# Patient Record
Sex: MALE | Race: WHITE | Employment: FULL TIME | URBAN - METROPOLITAN AREA
[De-identification: names, ages, dates, MRNs, and addresses within clinical notes are randomized per-mention and may not be internally consistent; named-entity substitution may affect disease eponyms.]

---

## 2015-02-07 LAB
CHOLESTEROL, TOTAL: 227 MG/DL
CHOLESTEROL/HDL RATIO: 6
HDLC SERPL-MCNC: 38 MG/DL (ref 35–70)
LDL CHOLESTEROL CALCULATED: 166 MG/DL (ref 0–160)
TRIGL SERPL-MCNC: 114 MG/DL
VLDLC SERPL CALC-MCNC: 23 MG/DL

## 2017-04-29 DIAGNOSIS — E78.5 HYPERLIPIDEMIA: ICD-10-CM

## 2017-06-29 ENCOUNTER — HOSPITAL ENCOUNTER (EMERGENCY)
Age: 47
Discharge: HOME | End: 2017-06-29
Attending: SPECIALIST
Payer: COMMERCIAL

## 2017-06-29 ENCOUNTER — APPOINTMENT (OUTPATIENT)
Dept: GENERAL RADIOLOGY | Age: 47
End: 2017-06-29
Payer: COMMERCIAL

## 2017-06-29 VITALS
TEMPERATURE: 98.4 F | WEIGHT: 210 LBS | OXYGEN SATURATION: 96 % | HEART RATE: 76 BPM | SYSTOLIC BLOOD PRESSURE: 136 MMHG | BODY MASS INDEX: 26.95 KG/M2 | DIASTOLIC BLOOD PRESSURE: 82 MMHG | RESPIRATION RATE: 18 BRPM | HEIGHT: 74 IN

## 2017-06-29 DIAGNOSIS — R07.89 CHEST TIGHTNESS: ICD-10-CM

## 2017-06-29 DIAGNOSIS — R42 DIZZINESS: Primary | ICD-10-CM

## 2017-06-29 LAB
ABSOLUTE EOS #: 0 K/UL (ref 0–0.4)
ABSOLUTE LYMPH #: 1.1 K/UL (ref 1–4.8)
ABSOLUTE MONO #: 0.4 K/UL (ref 0.1–1.2)
ANION GAP SERPL CALCULATED.3IONS-SCNC: 15 MMOL/L (ref 9–17)
BASOPHILS # BLD: 1 %
BASOPHILS ABSOLUTE: 0 K/UL (ref 0–0.2)
BUN BLDV-MCNC: 10 MG/DL (ref 6–20)
BUN/CREAT BLD: ABNORMAL (ref 9–20)
CALCIUM SERPL-MCNC: 9.1 MG/DL (ref 8.6–10.4)
CHLORIDE BLD-SCNC: 103 MMOL/L (ref 98–107)
CO2: 24 MMOL/L (ref 20–31)
CREAT SERPL-MCNC: 1.05 MG/DL (ref 0.7–1.2)
DIFFERENTIAL TYPE: NORMAL
EKG ATRIAL RATE: 60 BPM
EKG ATRIAL RATE: 74 BPM
EKG P AXIS: 35 DEGREES
EKG P AXIS: 42 DEGREES
EKG P-R INTERVAL: 130 MS
EKG P-R INTERVAL: 134 MS
EKG Q-T INTERVAL: 392 MS
EKG Q-T INTERVAL: 416 MS
EKG QRS DURATION: 102 MS
EKG QRS DURATION: 102 MS
EKG QTC CALCULATION (BAZETT): 416 MS
EKG QTC CALCULATION (BAZETT): 435 MS
EKG R AXIS: -12 DEGREES
EKG R AXIS: -15 DEGREES
EKG T AXIS: 18 DEGREES
EKG T AXIS: 22 DEGREES
EKG VENTRICULAR RATE: 60 BPM
EKG VENTRICULAR RATE: 74 BPM
EOSINOPHILS RELATIVE PERCENT: 1 %
GFR AFRICAN AMERICAN: >60 ML/MIN
GFR NON-AFRICAN AMERICAN: >60 ML/MIN
GFR SERPL CREATININE-BSD FRML MDRD: ABNORMAL ML/MIN/{1.73_M2}
GFR SERPL CREATININE-BSD FRML MDRD: ABNORMAL ML/MIN/{1.73_M2}
GLUCOSE BLD-MCNC: 120 MG/DL (ref 70–99)
HCT VFR BLD CALC: 50.9 % (ref 41–53)
HEMOGLOBIN: 16.9 G/DL (ref 13.5–17.5)
LYMPHOCYTES # BLD: 15 %
MAGNESIUM: 2.2 MG/DL (ref 1.6–2.6)
MCH RBC QN AUTO: 28.9 PG (ref 26–34)
MCHC RBC AUTO-ENTMCNC: 33.3 G/DL (ref 31–37)
MCV RBC AUTO: 86.9 FL (ref 80–100)
MONOCYTES # BLD: 5 %
MYOGLOBIN: 32 NG/ML (ref 28–72)
MYOGLOBIN: 35 NG/ML (ref 28–72)
PDW BLD-RTO: 13.1 % (ref 12.5–15.4)
PLATELET # BLD: 240 K/UL (ref 140–450)
PLATELET ESTIMATE: NORMAL
PMV BLD AUTO: 8.4 FL (ref 6–12)
POTASSIUM SERPL-SCNC: 3.7 MMOL/L (ref 3.7–5.3)
RBC # BLD: 5.86 M/UL (ref 4.5–5.9)
RBC # BLD: NORMAL 10*6/UL
SEG NEUTROPHILS: 78 %
SEGMENTED NEUTROPHILS ABSOLUTE COUNT: 5.8 K/UL (ref 1.8–7.7)
SODIUM BLD-SCNC: 142 MMOL/L (ref 135–144)
TROPONIN INTERP: NORMAL
TROPONIN INTERP: NORMAL
TROPONIN T: <0.03 NG/ML
TROPONIN T: <0.03 NG/ML
WBC # BLD: 7.4 K/UL (ref 3.5–11)
WBC # BLD: NORMAL 10*3/UL

## 2017-06-29 PROCEDURE — 84484 ASSAY OF TROPONIN QUANT: CPT

## 2017-06-29 PROCEDURE — 85025 COMPLETE CBC W/AUTO DIFF WBC: CPT

## 2017-06-29 PROCEDURE — 36415 COLL VENOUS BLD VENIPUNCTURE: CPT

## 2017-06-29 PROCEDURE — 99285 EMERGENCY DEPT VISIT HI MDM: CPT

## 2017-06-29 PROCEDURE — 71020 XR CHEST STANDARD TWO VW: CPT

## 2017-06-29 PROCEDURE — 96360 HYDRATION IV INFUSION INIT: CPT

## 2017-06-29 PROCEDURE — 6370000000 HC RX 637 (ALT 250 FOR IP): Performed by: SPECIALIST

## 2017-06-29 PROCEDURE — 80048 BASIC METABOLIC PNL TOTAL CA: CPT

## 2017-06-29 PROCEDURE — 93005 ELECTROCARDIOGRAM TRACING: CPT

## 2017-06-29 PROCEDURE — 83874 ASSAY OF MYOGLOBIN: CPT

## 2017-06-29 PROCEDURE — 83735 ASSAY OF MAGNESIUM: CPT

## 2017-06-29 PROCEDURE — 96361 HYDRATE IV INFUSION ADD-ON: CPT

## 2017-06-29 PROCEDURE — 2580000003 HC RX 258: Performed by: SPECIALIST

## 2017-06-29 RX ORDER — ASPIRIN 81 MG/1
324 TABLET, CHEWABLE ORAL ONCE
Status: COMPLETED | OUTPATIENT
Start: 2017-06-29 | End: 2017-06-29

## 2017-06-29 RX ORDER — DIAZEPAM 5 MG/1
5 TABLET ORAL EVERY 8 HOURS PRN
Qty: 12 TABLET | Refills: 0 | Status: SHIPPED | OUTPATIENT
Start: 2017-06-29 | End: 2017-07-31 | Stop reason: SDUPTHER

## 2017-06-29 RX ORDER — SODIUM CHLORIDE 9 MG/ML
INJECTION, SOLUTION INTRAVENOUS CONTINUOUS
Status: DISCONTINUED | OUTPATIENT
Start: 2017-06-29 | End: 2017-06-29 | Stop reason: HOSPADM

## 2017-06-29 RX ORDER — 0.9 % SODIUM CHLORIDE 0.9 %
500 INTRAVENOUS SOLUTION INTRAVENOUS ONCE
Status: COMPLETED | OUTPATIENT
Start: 2017-06-29 | End: 2017-06-29

## 2017-06-29 RX ORDER — DIAZEPAM 5 MG/1
5 TABLET ORAL ONCE
Status: COMPLETED | OUTPATIENT
Start: 2017-06-29 | End: 2017-06-29

## 2017-06-29 RX ADMIN — SODIUM CHLORIDE 500 ML: 9 INJECTION, SOLUTION INTRAVENOUS at 09:43

## 2017-06-29 RX ADMIN — DIAZEPAM 5 MG: 5 TABLET ORAL at 09:43

## 2017-06-29 RX ADMIN — ASPIRIN 81 MG 324 MG: 81 TABLET ORAL at 09:42

## 2017-06-29 ASSESSMENT — ENCOUNTER SYMPTOMS
NAUSEA: 1
VOMITING: 0
ABDOMINAL PAIN: 0
WHEEZING: 0
CHEST TIGHTNESS: 1
SHORTNESS OF BREATH: 0
DIARRHEA: 0

## 2017-07-20 PROBLEM — G47.9 SLEEP DISTURBANCE: Status: ACTIVE | Noted: 2017-07-20

## 2017-07-20 PROBLEM — F41.8 ANXIETY ABOUT HEALTH: Status: ACTIVE | Noted: 2017-07-20

## 2017-08-14 ENCOUNTER — PATIENT MESSAGE (OUTPATIENT)
Dept: FAMILY MEDICINE CLINIC | Age: 47
End: 2017-08-14

## 2017-08-31 PROBLEM — R10.13 DYSPEPSIA: Status: ACTIVE | Noted: 2017-08-31

## 2017-09-08 PROBLEM — G47.00 INSOMNIA: Status: ACTIVE | Noted: 2017-09-08

## 2017-10-02 PROBLEM — F41.1 GENERALIZED ANXIETY DISORDER: Status: ACTIVE | Noted: 2017-10-02

## 2018-02-14 ENCOUNTER — PATIENT MESSAGE (OUTPATIENT)
Dept: FAMILY MEDICINE CLINIC | Age: 48
End: 2018-02-14

## 2020-03-23 ENCOUNTER — PATIENT MESSAGE (OUTPATIENT)
Dept: PRIMARY CARE CLINIC | Age: 50
End: 2020-03-23

## 2020-03-24 RX ORDER — ZOLPIDEM TARTRATE 12.5 MG/1
12.5 TABLET, FILM COATED, EXTENDED RELEASE ORAL NIGHTLY PRN
Qty: 30 TABLET | Refills: 1 | Status: SHIPPED | OUTPATIENT
Start: 2020-03-24 | End: 2020-04-23

## 2020-06-01 ENCOUNTER — PATIENT MESSAGE (OUTPATIENT)
Dept: PRIMARY CARE CLINIC | Age: 50
End: 2020-06-01

## 2020-06-01 RX ORDER — ZOLPIDEM TARTRATE 10 MG/1
TABLET ORAL
Qty: 30 TABLET | Refills: 0 | Status: SHIPPED | OUTPATIENT
Start: 2020-06-01 | End: 2020-06-18 | Stop reason: SDUPTHER

## 2020-06-17 ENCOUNTER — PATIENT MESSAGE (OUTPATIENT)
Dept: PRIMARY CARE CLINIC | Age: 50
End: 2020-06-17

## 2020-06-18 RX ORDER — ZOLPIDEM TARTRATE 10 MG/1
TABLET ORAL
Qty: 30 TABLET | Refills: 0 | Status: SHIPPED | OUTPATIENT
Start: 2020-06-18 | End: 2020-08-07 | Stop reason: SDUPTHER

## 2020-06-18 NOTE — TELEPHONE ENCOUNTER
From: Bashir Bryan  To: Nahun Contreras PA-C  Sent: 6/17/2020 8:32 PM EDT  Subject: Prescription Question    Renee Serean Holstein,  So I have still been having some trouble sleeping. I only have a couple pills left on the last prescription. It was only 15 pills. Could I please get another 15 at least as they have been helping me sleep. I seem to have a few good nights of sleep then I have trouble for 2-3 nights in a row. Then I get anxiety about not being able to sleep. . I am positive its just anxiety with Covid my dad and everything going on. I have no other medical issues or troubles its just the sleep. Please let me know if you need anything else from me. Thanks! Bashir Bryan      ----- Message -----   From:Unique Wiley PA-C   Sent:6/1/2020 5:07 PM EDT   To:Rosalio Bloom   Subject:RE: Prescription Question    Nidia Vivienne,     So sorry to hear about your dad. I did send in Ambien 10mg 1 tablet at bedtime for a month.       ----- Message -----   Aline Bang   Sent:6/1/2020 9:09 AM EDT   To:Unique Wiley PA-C   Subject:Prescription Question    Hello,I hope you are well. I did use the Ambien Cr for a few weeks when you initially send the script. I seemed to be better and my anxiety was better through April. Carlyle Galdamez Unfortunately three weeks ago now my father passed a way (not of [place]) he was my best friend as well. So its been very dificult. I have had more trouble sleeping and just a bit overwhelmed with everything going one. No other troubles really. I have just a few pills of the Ambien Cr. The past several nights I have not been able to fall asleep until 2-3-4am then only sleep 2-4 hours. . Wondering if I could try some regular Amiben hoping it gets me to sleep quicker and hopefully I can get more sleep. Please let me know what if anything else I can try.  Thanks      ----- Message -----   From:Unique Wiley PA-C   Sent:3/24/2020 11:42 AM EDT   To:Rosalio Bloom   Subject:RE: Prescription Question    I sent Ambien Cr to [facility], your pharmacy on file.       ----- Message -----    Doreen Multani   Sent:3/23/2020 6:06 PM EDT   To:Unique Nj PA-C   Subject:RE: Prescription Question    I am pretty sure I was on the Ambien CR 12.5mg for sleep or a genirc of it. I know we tried a few but that Ambien seemed to be most effective during my troubles prior. I have some OTC stuff for the acid reflux it seems to be helping. Thanks,  Terrie Montiel      ----- Message -----    From:Unique Nj PA-C   Sent:3/23/2020 4:43 PM EDT   To:Rosalio Banks   Subject:RE: Prescription Question    Was it the Ambien CR 6.25mg or 12.5mg for the sleep. Are you taking anything for the acid reflux?       ----- Message -----   Doreen Multani   Sent:3/23/2020 4:17 PM EDT   To:Unique Nj PA-C   Subject:RE: Prescription Question    I have no other new medical conditions or anything now. Other than I do have some acid integestion and anxiety of course but, the sleep is what concerns me the most. Then its a cycle and I get anxiety about the lack of sleep too. . :(   We use the [facility] on [address] here if you can get a prescription that would be great. Thanks! Terrie Montiel      ----- Message -----   From:Unique Nj PA-C   Sent:3/23/2020 3:36 PM EDT   To:Rosalio Banks   Subject:RE: Prescription Question    You don't want to try the Evisit on your My Chart for this?       ----- Message -----   From:Rosalio Banks   Sent:3/23/2020 12:06 PM EDT   To:Unique Nj PA-C   Subject:Prescription Question    Is there any way to get a prescription refill for Ambien I have been having horrible trouble sleeping the past several nights. I have anxiety about the virus etc.. Do you do phone or tele visits as I do not want to go to an office now.    Thanks,  Johana Du

## 2020-08-07 ENCOUNTER — PATIENT MESSAGE (OUTPATIENT)
Dept: PRIMARY CARE CLINIC | Age: 50
End: 2020-08-07

## 2020-08-07 RX ORDER — ZOLPIDEM TARTRATE 10 MG/1
TABLET ORAL
Qty: 30 TABLET | Refills: 0 | Status: SHIPPED | OUTPATIENT
Start: 2020-08-07 | End: 2020-09-06

## 2020-08-07 NOTE — TELEPHONE ENCOUNTER
From: Desire Mauricio  To: Rosa Armstrong PA-C  Sent: 8/7/2020 8:09 AM EDT  Subject: Prescription Question    Vivian Eloisa,  Can I please get a refill of the Ambien I am still having trouble at times getting to sleep at night. The last few nights I have got very little sleep. No other issues just some anxiety over everything. Thanks,  Desire Mauricio      ----- Message -----   From:Unique Bahena PA-C    Sent:6/18/2020 3:13 PM EDT   To:Rosalio Salas   Subject:RE: Prescription Question    Karen Thompson,     I did sent #30 pills of Ambien in.       ----- Message -----   Solomon Bradford   Sent:6/17/2020 8:32 PM EDT   To:Unique Bahena PA-C   Subject:Prescription Question    Roxanna Olea,  So I have still been having some trouble sleeping. I only have a couple pills left on the last prescription. It was only 15 pills. Could I please get another 15 at least as they have been helping me sleep. I seem to have a few good nights of sleep then I have trouble for 2-3 nights in a row. Then I get anxiety about not being able to sleep. . I am positive its just anxiety with Covid my dad and everything going on. I have no other medical issues or troubles its just the sleep. Please let me know if you need anything else from me. Thanks! Desire Mauricio      ----- Message -----   From:Unique Bahena PA-C   Sent:6/1/2020 5:07 PM EDT   To:Rosalio Salas   Subject:RE: Prescription Question    Mark Hernandez,     So sorry to hear about your dad. I did send in Ambien 10mg 1 tablet at bedtime for a month.       ----- Message -----   Solomon Bradford   Sent:6/1/2020 9:09 AM EDT   To:Unique Bahena PA-C   Subject:Prescription Question    Hello,I hope you are well. I did use the Ambien Cr for a few weeks when you initially send the script. I seemed to be better and my anxiety was better through April. Silvia Ty Unfortunately three weeks ago now my father passed a way (not of [place]) he was my best friend as well. So its been very dificult.  I have had more trouble sleeping and just a bit overwhelmed with everything going one. No other troubles really. I have just a few pills of the Ambien Cr. The past several nights I have not been able to fall asleep until 2-3-4am then only sleep 2-4 hours. . Wondering if I could try some regular Amiben hoping it gets me to sleep quicker and hopefully I can get more sleep. Please let me know what if anything else I can try. Thanks      ----- Message -----   From:Unique Can PA-C    Sent:3/24/2020 11:42 AM EDT   To:Rosalio Leonardo   Subject:RE: Prescription Question    I sent Ambien Cr to [facility], your pharmacy on file.       ----- Message -----   Lana Santos   Sent:3/23/2020 6:06 PM EDT   To:Unique Can PA-C   Subject:RE: Prescription Question    I am pretty sure I was on the Ambien CR 12.5mg for sleep or a genirc of it. I know we tried a few but that Ambien seemed to be most effective during my troubles prior. I have some OTC stuff for the acid reflux it seems to be helping. Thanks,  Roxann Hernandez      ----- Message -----   From:Unique Can PA-C   Sent:3/23/2020 4:43 PM EDT   To:Rosalio Leonardo   Subject:RE: Prescription Question    Was it the Ambien CR 6.25mg or 12.5mg for the sleep. Are you taking anything for the acid reflux?        ----- Message -----   Lana Santos   Sent:3/23/2020 4:17 PM EDT   To:Unique Can PA-C   Subject:RE: Prescription Question    I have no other new medical conditions or anything now. Other than I do have some acid integestion and anxiety of course but, the sleep is what concerns me the most. Then its a cycle and I get anxiety about the lack of sleep too. . :(   We use the [facility] on [address] here if you can get a prescription that would be great. Thanks!   Roxann Hernandez      ----- Message -----   From:Unique Can PA-C   Sent:3/23/2020 3:36 PM EDT   To:Rosalio Leonardo   Subject:RE: Prescription Question    You don't want to try the Evisit on your My Chart for this?       -----

## 2021-01-13 ENCOUNTER — PATIENT MESSAGE (OUTPATIENT)
Dept: PRIMARY CARE CLINIC | Age: 51
End: 2021-01-13

## 2021-01-13 DIAGNOSIS — G47.00 INSOMNIA, UNSPECIFIED TYPE: Primary | ICD-10-CM

## 2021-01-13 RX ORDER — ZOLPIDEM TARTRATE 5 MG/1
5 TABLET ORAL NIGHTLY PRN
Qty: 10 TABLET | Refills: 0 | Status: SHIPPED | OUTPATIENT
Start: 2021-01-13 | End: 2021-01-18

## 2021-02-25 DIAGNOSIS — G47.00 INSOMNIA, UNSPECIFIED TYPE: Primary | ICD-10-CM

## 2021-02-25 RX ORDER — RAMELTEON 8 MG/1
8 TABLET ORAL NIGHTLY PRN
Qty: 30 TABLET | Refills: 0 | Status: SHIPPED | OUTPATIENT
Start: 2021-02-25 | End: 2021-03-25 | Stop reason: SDUPTHER

## 2021-03-05 ENCOUNTER — TELEPHONE (OUTPATIENT)
Dept: PRIMARY CARE CLINIC | Age: 51
End: 2021-03-05

## 2021-03-05 NOTE — TELEPHONE ENCOUNTER
Please give him info to call [facility] to set up a sleep study or see Dr. Marciano Rolon, sleep specialist, if he decides against sleep study

## 2021-03-05 NOTE — TELEPHONE ENCOUNTER
----- Message from Isabella Recinos sent at 3/1/2021  9:06 AM EST -----  Sorry for the inconvenience but we are not a provider for patient's insurance:    PARAMOUNT/ID beginning with ID \"P\". This typically is for a [facility] [email or web address]]. [email or web address]  [phone] (p)  [phone] (f)